# Patient Record
Sex: MALE | Race: WHITE | NOT HISPANIC OR LATINO | URBAN - METROPOLITAN AREA
[De-identification: names, ages, dates, MRNs, and addresses within clinical notes are randomized per-mention and may not be internally consistent; named-entity substitution may affect disease eponyms.]

---

## 2017-04-05 ENCOUNTER — OFFICE VISIT (OUTPATIENT)
Dept: RETAIL CLINIC | Facility: CLINIC | Age: 42
End: 2017-04-05

## 2017-04-05 VITALS
DIASTOLIC BLOOD PRESSURE: 82 MMHG | SYSTOLIC BLOOD PRESSURE: 128 MMHG | TEMPERATURE: 99.6 F | WEIGHT: 266.4 LBS | HEIGHT: 67 IN | OXYGEN SATURATION: 97 % | BODY MASS INDEX: 41.81 KG/M2 | HEART RATE: 119 BPM | RESPIRATION RATE: 20 BRPM

## 2017-04-05 DIAGNOSIS — H10.022 PINK EYE, LEFT: Primary | ICD-10-CM

## 2017-04-05 PROCEDURE — 99202 OFFICE O/P NEW SF 15 MIN: CPT | Performed by: NURSE PRACTITIONER

## 2017-04-05 RX ORDER — CIPROFLOXACIN HYDROCHLORIDE 3.5 MG/ML
1 SOLUTION/ DROPS TOPICAL
Qty: 2.5 ML | Refills: 0 | Status: SHIPPED | OUTPATIENT
Start: 2017-04-05 | End: 2017-04-12

## 2017-04-05 RX ORDER — TOBRAMYCIN AND DEXAMETHASONE 3; 1 MG/ML; MG/ML
1 SUSPENSION/ DROPS OPHTHALMIC
Qty: 2.5 ML | Refills: 0 | Status: SHIPPED | OUTPATIENT
Start: 2017-04-05 | End: 2017-04-05

## 2017-04-05 NOTE — PROGRESS NOTES
Subjective   Ahmet Alva is a 41 y.o. male.   Chief Complaint   Patient presents with   • Conjunctivitis     History of Present Illness   Left eye red & irritated for 2 days. Started inner corner and has spread to whole eye. Draining & crusted shut this morning. Sent home from work today. No light sensitivity. Vision occasionally blurred because of drainage. No contact lenses, no known injury. Is a  always wears eye protection.      The following portions of the patient's history were reviewed and updated as appropriate: allergies, current medications, past medical history, past social history, past surgical history and problem list.    Review of Systems   Constitutional: Negative for fever.   Eyes: Positive for pain (irritated), discharge and redness. Negative for photophobia and visual disturbance.       Objective   Vitals:    04/05/17 1645   BP: 128/82   Pulse: 119   Resp: 20   Temp: 99.6 °F (37.6 °C)   SpO2: 97%     Physical Exam   Constitutional: He appears well-developed and well-nourished. No distress.   Eyes: EOM and lids are normal. Pupils are equal, round, and reactive to light. Left conjunctiva is injected.               Assessment/Plan   Ahmet was seen today for conjunctivitis.    Diagnoses and all orders for this visit:    Pink eye, left    Other orders  -     Discontinue: tobramycin-dexamethasone (TOBRADEX) 0.3-0.1 % ophthalmic suspension; Administer 1 drop into the left eye Every 4 (Four) Hours While Awake for 7 days.  -     ciprofloxacin (CILOXAN) 0.3 % ophthalmic solution; Administer 1 drop into the left eye Every 4 (Four) Hours While Awake for 7 days.                   Plan of care reviewed with patient &/or caregiver at the conclusion of today's visit. Education was provided in regards to diagnosis, symptom management and any prescribed or recommended OTC medications.  Advisedt to follow up with PCP, go to Urgent Care Center, or Emergency Room if symptoms worsen. Patient and/or  caregiver verbalized understanding    Idalia Howell, APRN

## 2022-08-09 NOTE — PATIENT INSTRUCTIONS
Bacterial Conjunctivitis  Bacterial conjunctivitis is an infection of the clear membrane that covers the white part of your eye and the inner surface of your eyelid (conjunctiva). When the blood vessels in your conjunctiva become inflamed, your eye becomes red or pink, and it will probably feel itchy. Bacterial conjunctivitis spreads very easily from person to person (is contagious). It also spreads easily from one eye to the other eye.  CAUSES  This condition is caused by several common bacteria. You may get the infection if you come into close contact with another person who is infected. You may also come into contact with items that are contaminated with the bacteria, such as a face towel, contact lens solution, or eye makeup.  RISK FACTORS  This condition is more likely to develop in people who:  · Are exposed to other people who have the infection.  · Wear contact lenses.  · Have a sinus infection.  · Have had a recent eye injury or surgery.  · Have a weak body defense system (immune system).  · Have a medical condition that causes dry eyes.  SYMPTOMS  Symptoms of this condition include:  · Eye redness.  · Tearing or watery eyes.  · Itchy eyes.  · Burning feeling in your eyes.  · Thick, yellowish discharge from an eye. This may turn into a crust on the eyelid overnight and cause your eyelids to stick together.  · Swollen eyelids.  · Blurred vision.  DIAGNOSIS  Your health care provider can diagnose this condition based on your symptoms and medical history. Your health care provider may also take a sample of discharge from your eye to find the cause of your infection. This is rarely done.  TREATMENT  Treatment for this condition includes:  · Antibiotic eye drops or ointment to clear the infection more quickly and prevent the spread of infection to others.  · Oral antibiotic medicines to treat infections that do not respond to drops or ointments, or last longer than 10 days.  · Cool, wet cloths (cool compresses)  placed on the eyes.  · Artificial tears applied 2-6 times a day.  HOME CARE INSTRUCTIONS  Medicines  · Take or apply your antibiotic medicine as told by your health care provider. Do not stop taking or applying the antibiotic even if you start to feel better.  · Take or apply over-the-counter and prescription medicines only as told by your health care provider.  · Be very careful to avoid touching the edge of your eyelid with the eye drop bottle or the ointment tube when you apply medicines to the affected eye. This will keep you from spreading the infection to your other eye or to other people.  Managing Discomfort  · Gently wipe away any drainage from your eye with a warm, wet washcloth or a cotton ball.  · Apply a cool, clean washcloth to your eye for 10-20 minutes, 3-4 times a day.  General Instructions  · Do not wear contact lenses until the inflammation is gone and your health care provider says it is safe to wear them again. Ask your health care provider how to sterilize or replace your contact lenses before you use them again. Wear glasses until you can resume wearing contacts.  · Avoid wearing eye makeup until the inflammation is gone. Throw away any old eye cosmetics that may be contaminated.  · Change or wash your pillowcase every day.  · Do not share towels or washcloths. This may spread the infection.  · Wash your hands often with soap and water. Use paper towels to dry your hands.  · Avoid touching or rubbing your eyes.  · Do not drive or use heavy machinery if your vision is blurred.  SEEK MEDICAL CARE IF:  · You have a fever.  · Your symptoms do not get better after 10 days.  SEEK IMMEDIATE MEDICAL CARE IF:  · You have a fever and your symptoms suddenly get worse.  · You have severe pain when you move your eye.  · You have facial pain, redness, or swelling.  · You have sudden loss of vision.     This information is not intended to replace advice given to you by your health care provider. Make sure  you discuss any questions you have with your health care provider.     Document Released: 12/18/2006 Document Revised: 01/13/2017 Document Reviewed: 09/29/2016  [employer] Patient Education ©2016 [employer]     Thalidomide Counseling: I discussed with the patient the risks of thalidomide including but not limited to birth defects, anxiety, weakness, chest pain, dizziness, cough and severe allergy.